# Patient Record
Sex: MALE | Race: WHITE | NOT HISPANIC OR LATINO | URBAN - METROPOLITAN AREA
[De-identification: names, ages, dates, MRNs, and addresses within clinical notes are randomized per-mention and may not be internally consistent; named-entity substitution may affect disease eponyms.]

---

## 2017-01-11 ENCOUNTER — OFFICE VISIT - RIVER FALLS (OUTPATIENT)
Dept: FAMILY MEDICINE | Facility: CLINIC | Age: 62
End: 2017-01-11

## 2017-01-11 ASSESSMENT — MIFFLIN-ST. JEOR: SCORE: 1834.86

## 2017-01-23 ENCOUNTER — OFFICE VISIT - RIVER FALLS (OUTPATIENT)
Dept: FAMILY MEDICINE | Facility: CLINIC | Age: 62
End: 2017-01-23

## 2017-01-26 ENCOUNTER — OFFICE VISIT - RIVER FALLS (OUTPATIENT)
Dept: FAMILY MEDICINE | Facility: CLINIC | Age: 62
End: 2017-01-26

## 2017-01-30 ENCOUNTER — OFFICE VISIT - RIVER FALLS (OUTPATIENT)
Dept: FAMILY MEDICINE | Facility: CLINIC | Age: 62
End: 2017-01-30

## 2017-02-02 ENCOUNTER — OFFICE VISIT - RIVER FALLS (OUTPATIENT)
Dept: FAMILY MEDICINE | Facility: CLINIC | Age: 62
End: 2017-02-02

## 2017-02-06 ENCOUNTER — OFFICE VISIT - RIVER FALLS (OUTPATIENT)
Dept: FAMILY MEDICINE | Facility: CLINIC | Age: 62
End: 2017-02-06

## 2017-07-19 ENCOUNTER — OFFICE VISIT - RIVER FALLS (OUTPATIENT)
Dept: FAMILY MEDICINE | Facility: CLINIC | Age: 62
End: 2017-07-19

## 2017-10-24 ENCOUNTER — AMBULATORY - RIVER FALLS (OUTPATIENT)
Dept: FAMILY MEDICINE | Facility: CLINIC | Age: 62
End: 2017-10-24

## 2017-10-25 LAB
CHOLEST SERPL-MCNC: 254 MG/DL
CHOLEST/HDLC SERPL: 3.6 {RATIO}
HDLC SERPL-MCNC: 70 MG/DL
LDLC SERPL CALC-MCNC: 164 MG/DL
NONHDLC SERPL-MCNC: 184 MG/DL
TRIGL SERPL-MCNC: 96 MG/DL

## 2017-10-31 ENCOUNTER — OFFICE VISIT - RIVER FALLS (OUTPATIENT)
Dept: FAMILY MEDICINE | Facility: CLINIC | Age: 62
End: 2017-10-31

## 2017-10-31 ASSESSMENT — MIFFLIN-ST. JEOR: SCORE: 1835.77

## 2018-01-10 ENCOUNTER — OFFICE VISIT - RIVER FALLS (OUTPATIENT)
Dept: FAMILY MEDICINE | Facility: CLINIC | Age: 63
End: 2018-01-10

## 2018-11-16 ENCOUNTER — OFFICE VISIT - RIVER FALLS (OUTPATIENT)
Dept: FAMILY MEDICINE | Facility: CLINIC | Age: 63
End: 2018-11-16

## 2018-11-17 LAB
CHOLEST SERPL-MCNC: 227 MG/DL
CHOLEST/HDLC SERPL: 3.3 {RATIO}
CREAT SERPL-MCNC: 1.12 MG/DL (ref 0.7–1.25)
GLUCOSE BLD-MCNC: 111 MG/DL (ref 65–99)
HDLC SERPL-MCNC: 68 MG/DL
LDLC SERPL CALC-MCNC: 134 MG/DL
NONHDLC SERPL-MCNC: 159 MG/DL
TRIGL SERPL-MCNC: 137 MG/DL

## 2018-11-27 ENCOUNTER — OFFICE VISIT - RIVER FALLS (OUTPATIENT)
Dept: FAMILY MEDICINE | Facility: CLINIC | Age: 63
End: 2018-11-27

## 2019-06-25 ENCOUNTER — COMMUNICATION - RIVER FALLS (OUTPATIENT)
Dept: FAMILY MEDICINE | Facility: CLINIC | Age: 64
End: 2019-06-25

## 2019-12-20 ENCOUNTER — AMBULATORY - RIVER FALLS (OUTPATIENT)
Dept: FAMILY MEDICINE | Facility: CLINIC | Age: 64
End: 2019-12-20

## 2019-12-21 LAB
BUN SERPL-MCNC: 16 MG/DL (ref 7–25)
BUN/CREAT RATIO - HISTORICAL: ABNORMAL (ref 6–22)
CALCIUM SERPL-MCNC: 9.3 MG/DL (ref 8.6–10.3)
CHLORIDE BLD-SCNC: 106 MMOL/L (ref 98–110)
CHOLEST SERPL-MCNC: 245 MG/DL
CHOLEST/HDLC SERPL: 4 {RATIO}
CO2 SERPL-SCNC: 27 MMOL/L (ref 20–32)
CREAT SERPL-MCNC: 1.13 MG/DL (ref 0.7–1.25)
EGFRCR SERPLBLD CKD-EPI 2021: 69 ML/MIN/1.73M2
GLUCOSE BLD-MCNC: 101 MG/DL (ref 65–99)
HDLC SERPL-MCNC: 62 MG/DL
LDLC SERPL CALC-MCNC: 157 MG/DL
NONHDLC SERPL-MCNC: 183 MG/DL
POTASSIUM BLD-SCNC: 4.4 MMOL/L (ref 3.5–5.3)
SODIUM SERPL-SCNC: 140 MMOL/L (ref 135–146)
TRIGL SERPL-MCNC: 137 MG/DL

## 2019-12-23 ENCOUNTER — COMMUNICATION - RIVER FALLS (OUTPATIENT)
Dept: FAMILY MEDICINE | Facility: CLINIC | Age: 64
End: 2019-12-23

## 2019-12-27 ENCOUNTER — OFFICE VISIT - RIVER FALLS (OUTPATIENT)
Dept: FAMILY MEDICINE | Facility: CLINIC | Age: 64
End: 2019-12-27

## 2019-12-27 ASSESSMENT — MIFFLIN-ST. JEOR: SCORE: 1862.99

## 2021-06-01 ENCOUNTER — RECORDS - HEALTHEAST (OUTPATIENT)
Dept: ADMINISTRATIVE | Facility: CLINIC | Age: 66
End: 2021-06-01

## 2022-02-11 VITALS
HEIGHT: 72 IN | SYSTOLIC BLOOD PRESSURE: 130 MMHG | WEIGHT: 222.4 LBS | DIASTOLIC BLOOD PRESSURE: 76 MMHG | BODY MASS INDEX: 30.12 KG/M2 | TEMPERATURE: 97.3 F | HEART RATE: 60 BPM

## 2022-02-11 VITALS
DIASTOLIC BLOOD PRESSURE: 70 MMHG | WEIGHT: 223.12 LBS | SYSTOLIC BLOOD PRESSURE: 120 MMHG | HEART RATE: 60 BPM | BODY MASS INDEX: 30.05 KG/M2 | TEMPERATURE: 97.4 F

## 2022-02-11 VITALS
SYSTOLIC BLOOD PRESSURE: 120 MMHG | TEMPERATURE: 97 F | BODY MASS INDEX: 30.94 KG/M2 | WEIGHT: 228.4 LBS | HEIGHT: 72 IN | HEART RATE: 80 BPM | DIASTOLIC BLOOD PRESSURE: 66 MMHG

## 2022-02-11 VITALS
WEIGHT: 222.2 LBS | BODY MASS INDEX: 30.1 KG/M2 | SYSTOLIC BLOOD PRESSURE: 112 MMHG | HEIGHT: 72 IN | TEMPERATURE: 98.1 F | DIASTOLIC BLOOD PRESSURE: 78 MMHG | HEART RATE: 60 BPM

## 2022-02-16 NOTE — TELEPHONE ENCOUNTER
Entered by Evelyn Leung CMA on February 18, 2021 10:06:42 AM CST  ---------------------  From: Evelyn Leung CMA   To: [facility]    Sent: 2/18/2021 10:06:42 AM CST  Subject: Medication Management     ** Not Approved: Patient no longer under Prescriber care **  doxycycline (DOXYCYCLINE MONOHYDRATE 50MG CAPS)  TAKE ONE CAPSULE BY MOUTH EVERY DAY  Qty:  90 unknown unit        Days Supply:  90        Refills:  3          Substitutions Allowed     Route To Pharmacy - [facility]   Signed by Evelyn Leung CMA            ------------------------------------------  From: [facility]  To: Amauri Kidd MD  Sent: February 17, 2021 4:19:01 PM CST  Subject: Medication Management  Due: February 17, 2021 2:12:29 PM CST     ** On Hold Pending Signature **     Drug: doxycycline (doxycycline monohydrate 50 mg oral capsule), TAKE ONE CAPSULE BY MOUTH EVERY DAY  Quantity: 90 unknown unit  Days Supply: 90  Refills: 2  Substitutions Allowed  Notes from Pharmacy:     Dispensed Drug: doxycycline (doxycycline monohydrate 50 mg oral capsule), TAKE ONE CAPSULE BY MOUTH EVERY DAY  Quantity: 90 unknown unit  Days Supply: 90  Refills: 3  Substitutions Allowed  Notes from Pharmacy:  ------------------------------------------

## 2022-02-16 NOTE — PROGRESS NOTES
Patient:   JOSE DE JESUS DUQUE            MRN: [id number]            FIN: [id number]               Age:   61 years     Sex:  Male     :  1955   Associated Diagnoses:   Right cervical radiculopathy; DJD (Degenerative Joint Disease); Hypertension; Facial skin lesion   Author:   Amauri Kidd MD      Visit Information      Date of Service: 2017 12:48 pm  Performing Location: [facility]  Encounter#: [id number]      Primary Care Provider (PCP):  Amauri Kidd MD    NPI# [id number]      Referring Provider:  No referring provider recorded for selected visit.      Chief Complaint   2017 12:55 PM CST   Preop scheduled for right knee replacement with Di[facility]  at [facility]  2.  non-healing lesion right cheek x 6mos              Additional Information:No additional information recorded during visit.   Chief complaint and symptoms as noted above and confirmed with patient.  Recent lab and diagnostic studies reviewed with patient      History of Present Illness   2016: Jose De Jesus returns to clinic to review recent MRI of cervical spine.  He has recently been seen by Dr. Washington on multiple occasions related to the same complaints.  He describes having a right-sided neck and shoulder pains now for approximately 10 days.  He says the pains will extend down his right forearm into the dorsal aspect of his hand.  Denies any paresthesias.  No weakness.  He has completed a course of oral prednisone as well as trialing gabapentin 300 mg twice daily.  He also has tried oxycodone for pain relief.  He has not found any medication options to have affected the pain.  He was previously seen by his chiropractor and now is working with physical therapy; his first session was earlier today.  He was originally scheduled for knee surgery this week which was postponed due to his current symptoms.  His main goal is to call down the symptoms so he can continue his pursuits of having his knee surgery done.     2017: Jose De Jesus  presents to clinic for preoperative assessment before planned right total knee arthroplasty scheduled for January 18.  Surgery has been rescheduled once due to cervical radiculopathy.  His neck pains have since resolved with use of both physical therapy and gabapentin.  Currently taking gabapentin 300 mg 3 times a day.  He has not tried to wean down to any lower dose.  Feeling well.  No specific complaints.  Good functional status.  No previous perioperative complications.  He did undergo a right total hip replacement over a year ago without any trouble.  Also brings up concerns related to nonhealing bleeding lesion on his right cheek.         Review of Systems   Constitutional:  No fever, No chills.    Eye:  Negative except as documented in history of present illness.    Ear/Nose/Mouth/Throat:  Negative except as documented in history of present illness.    Respiratory:  No shortness of breath.    Cardiovascular:  No chest pain, No palpitations, No peripheral edema, No syncope.    Gastrointestinal:  No nausea, No vomiting, No abdominal pain.    Genitourinary:  No dysuria, No hematuria.    Hematology/Lymphatics:  Negative except as documented in history of present illness.    Endocrine:  No excessive thirst, No polyuria.    Immunologic:  No recurrent fevers.    Musculoskeletal:  Muscle pain, No neck pain, No joint pain.    Neurologic:  Alert and oriented X4, No numbness, No tingling, No headache.       Health Status   Allergies:    Allergic Reactions (Selected)  No known allergies   Medications:  (Selected)   Prescriptions  Prescribed  Inderal LA 60 mg oral capsule, extended release: 1 cap(s) ( 60 mg ), po, daily, x 90 day(s), # 90 cap(s), 3 Refill(s), Type: Physician Stop, Pharmacy: [facility] (Mail Order) Electronic, 1 cap(s) po daily,x90 day(s)  gabapentin 300 mg oral capsule: 1 cap(s) ( 300 mg ), po, bid, # 270 cap(s), 3 Refill(s), Type: Maintenance, called to pharmacy (Rx), increased dose  minocycline 50 mg oral  capsule: 1 cap(s) ( 50 mg ), po, bid, x 90 day(s), # 180 cap(s), 3 Refill(s), Type: Physician Stop, Pharmacy: [facility] (Mail Order) Electronic, 1 cap(s) po bid,x90 day(s)   Problem list:    All Problems  DJD (Degenerative Joint Disease) / ICD-9-.90 / Confirmed  Essential tremor / ICD-9-.1 / Confirmed  Warfarin anticoagulation / SNOMED CT 237079836 / Confirmed  Status post right hip replacement / SNOMED CT 502268130 / Confirmed  Hypertension / SNOMED CT 6644509917 / Confirmed  Obesity / SNOMED CT 4555977458 / Probable  ROSACEA / ICD-9-.3 / Confirmed  Resolved: Colonoscopy / SNOMED CT 349653017      Histories   Past Medical History:    Active  ROSACEA (695.3): Onset in 1995 at 39 years.  DJD (Degenerative Joint Disease) (715.90)  Essential tremor (333.1)  Hypertension (0111211827)  Resolved  Colonoscopy (067667755):  Resolved.  Comments:  10/19/2016 CDT 6:34 PM CDT - Adri Murray MA  Done on 10/17/2016, Repeat on 10 years   Family History:    Pulmonary fibrosis  Father  Acne rosacea  Brother  Brother  Cancer of prostate  Father  Crohn disease  Daughter (Araceli)  Alzheimer disease  Mother     Procedure history:    Colonoscopy (930597840) on 10/17/2016 at 61 Years.  Comments:  10/25/2016 7:54 AM - Jessenia Chauhan  Indication:  Screening for colorectal malignant neoplasm.  Sedation:  Midazolam 3 mg IV, Fentanyl 150 mcg IV.  Impression:  Civerticulosis in the sigmoid colon; exam otherwise normal.  Recommendation:  Repeat in 10 years.  Arthrodesis ([id number]) on 10/26/2009 at 54 Years.  Comments:  5/9/2013 1:18 PM - Collette Kidd  [facility]    5/9/2013 1:13 PM - Collette Kidd  Left subtalar joint.  Surgeon: Sunil Ambrocio MD.  Excision of osteophyte (524730176) on 2/23/2009 at 53 Years.  Comments:  5/9/2013 1:28 PM - Collette Kidd  Excision of bone spur.    5/9/2013 1:18 PM - Collette Kidd  Left.  Open ankle procedure, medially and laterally.  Sunil Ambrocio MD, [facility].  Repair of knee  joint ([id number]) on 11/8/2007 at 52 Years.  Comments:  5/9/2013 1:34 PM - Collette Kidd  Right.  Dr. Varghese, [place], [place].  Colonoscopy ([id number]) on 3/8/2006 at 50 Years.  Removal of mole of skin by excision (300033585) on 6/23/2000 at 44 Years.  Vasectomy (44606646) in 1986 at 31 Years.  Tonsillectomy (833322251) in 1961 at 6 Years.  Comments:  5/9/2013 1:14 PM - Collette Kidd  As a child.  Left ankle (00320068).   Social History:        Alcohol Assessment            Current, Beer (12 oz), 3-5 times per week, 3 drinks/episode average.      Tobacco Assessment            Never      Substance Abuse Assessment            Never      Employment and Education Assessment            Employed, Work/School description: Retired.      Home and Environment Assessment            Marital status:  (Living together).  Spouse/Partner name: Eli.  2 children.      Nutrition and Health Assessment            Type of diet: Regular.      Exercise and Physical Activity Assessment            Exercise frequency: Daily.  Exercise type: stretching.      Sexual Assessment            Sexually active: Yes.  Sexual orientation: Heterosexual.      Other Assessment            . Last Eye Exam: 04/2015 and Last Dental Exam: 07/13/2015., Marital status.  Children: Judd        Physical Examination   vital signs stable, as noted above   Vital Signs   1/11/2017 12:55 PM CST Temperature Tympanic 98.1 DegF    Peripheral Pulse Rate 60 bpm    Systolic Blood Pressure 112 mmHg    Diastolic Blood Pressure 78 mmHg    Mean Arterial Pressure 89 mmHg      Measurements from flowsheet : Measurements   1/11/2017 12:55 PM CST Height Measured - Standard 72.25 in    Weight Measured - Standard 222.2 lb    BSA 2.26 m2    Body Mass Index 29.92 kg/m2      General:  Alert and oriented, No acute distress.    Eye:  Extraocular movements are intact.    HENT:  Normocephalic, Oral mucosa is moist.    Neck:  Supple, Non-tender.     Respiratory:  Lungs are clear to auscultation, Respirations are non-labored.    Cardiovascular:  Normal rate, Regular rhythm, No murmur, No edema.    Gastrointestinal:  Soft.    Musculoskeletal:  Normal range of motion, Normal strength, No tenderness.    Integumentary:  bleeding, facial ulceration on right cheek.    Neurologic:  Alert, Oriented, Normal motor function, No focal deficits.    Cognition and Speech:  Oriented, Speech clear and coherent.    Psychiatric:  Appropriate mood & affect.       Review / Management   Results review:  Lab results   1/11/2017 1:46 PM CST ABO/Rh O Rh Positive    Antibody Screen Negative    BB Specimen Expiration Dt/Tm 01/15/17 23:59   11/17/2016 4:50 PM CST Sodium Level 138 mmol/L    Potassium Level 4.5 mmol/L    Chloride Level 104 mmol/L    CO2 Level 27 mmol/L    Glucose Level 92 mg/dL    BUN 23 mg/dL    Creatinine 1.39 mg/dL  HI    BUN/Creat Ratio 17    eGFR 55 mL/min/1.73m2  LOW    eGFR African American 63 mL/min/1.73m2    Calcium Level 9.5 mg/dL    WBC 5.9    RBC 4.29    Hgb 13.9 gm/dL    Hct 40.5 %    MCV 94.5 fL    MCH 32.5 pg    MCHC 34.4 gm/dL    RDW 13.0 %    Platelet 225    MPV 7.6 fL   .       Impression and Plan   Diagnosis     Right cervical radiculopathy (OVV13-JG M54.12).     DJD (Degenerative Joint Disease) (GVO04-KK M19.90).     Hypertension (XBT10-XM I10).     Facial skin lesion (KDO97-AC L98.9).       .) pre-op, planned right total knee arthroplasty, 1/18/2017, [facility], Dr. Ramirez   - ECG (11/2016), reviewed: NSR   - obtain BMP, CBC, T&S   - continue with propranolol ER throughout ben-operative timeframe   - advised reducing gabapentin to 300mg BID; further plans to wean off after completion of surgery   - moderate risk procedure in patient with low risk patient profile; no further pre-operative risk assessment indicated    .) cervicalgia  MRI of cervical spine (11/2016)   - C3-C4, C4-C5, C6-7 foraminal stenosis on right side; most severe at C4-C5   - clinically  his symptoms correlate with right sided C5 radiculopathy   - improved with conservative measures    .) facial skin lesion   - persistent for several months with bleeding despite limited local trauma from shaving   - referral to derm

## 2022-02-16 NOTE — LETTER
(Inserted Image. Unable to display)   December 23, 2019      XAVIER NETO   [facility] [address]  [place], [place] [id number]        Dear XAVIER,     Thank you for selecting [facility] (previously [facility]) for your healthcare needs. Below you will find the results of your recent test(s) done at our clinic.       Labs for your review.  We can discuss in more detail at future clinic visit.       Result Name Current Result Previous Result Reference Range   Sodium Level (mmol/L)  140 12/20/2019  138 11/16/2018 135 - 146   Potassium Level (mmol/L)  4.4 12/20/2019  4.4 11/16/2018 3.5 - 5.3   Chloride Level (mmol/L)  106 12/20/2019  106 11/16/2018 98 - 110   CO2 Level (mmol/L)  27 12/20/2019  27 11/16/2018 20 - 32   Glucose Level (mg/dL) ((H)) 101 12/20/2019 ((H)) 111 11/16/2018 65 - 99   BUN (mg/dL)  16 12/20/2019  20 11/16/2018 7 - 25   Creatinine Level (mg/dL)  1.13 12/20/2019  1.12 11/16/2018 0.70 - 1.25   BUN/Creat Ratio  NOT APPLICABLE 12/20/2019  NOT APPLICABLE 11/16/2018 6 - 22   eGFR (mL/min/1.73m2)  69 12/20/2019  70 11/16/2018 > OR = 60 -    eGFR  (mL/min/1.73m2)  80 12/20/2019  81 11/16/2018 > OR = 60 -    Calcium Level (mg/dL)  9.3 12/20/2019  9.5 11/16/2018 8.6 - 10.3   Cholesterol (mg/dL) ((H)) 245 12/20/2019 ((H)) 227 11/16/2018  - <200   Non-HDL Cholesterol ((H)) 183 12/20/2019 ((H)) 159 11/16/2018  - <130   HDL (mg/dL)  62 12/20/2019  68 11/16/2018 >40 -    Cholesterol/HDL Ratio  4.0 12/20/2019  3.3 11/16/2018  - <5.0   LDL ((H)) 157 12/20/2019 ((H)) 134 11/16/2018    Triglyceride (mg/dL)  137 12/20/2019  137 11/16/2018  - <150       Please contact me or my assistant at [phone] if you have any questions or concerns.     Sincerely,        Amauri Kidd MD    What do your labs mean?  Below is a glossary of commonly ordered labs:  LDL - Bad Cholesterol  HDL - Good Cholesterol  AST/ALT - Liver Function  Cr/Creatinine - Kidney Function  Microalbumin - Kidney  Function  BUN - Kidney Function  PSA - Prostate   TSH - Thyroid Hormone  HgbA1c - Diabetes Test  Hgb (Hemoglobin) - Red Blood Cells

## 2022-02-16 NOTE — TELEPHONE ENCOUNTER
Entered by Latanya Marquez CMA on February 18, 2021 1:32:54 PM CST  ---------------------  From: Latanya Marquez CMA   To: [facility]    Sent: 2/18/2021 1:32:54 PM CST  Subject: Medication Management     ** Not Approved: Patient no longer under Prescriber care, second request **  doxycycline (DOXYCYCLINE MONOHYDRATE 50MG CAPS)  TAKE ONE CAPSULE BY MOUTH EVERY DAY  Qty:  90 unknown unit        Days Supply:  90        Refills:  3          Substitutions Allowed     Route To Pharmacy - [facility]   Signed by Latanya Marquez CMA            ------------------------------------------  From: [facility]  To: Amauri Kidd MD  Sent: February 18, 2021 11:21:00 AM CST  Subject: Medication Management  Due: February 17, 2021 2:12:29 PM CST     ** On Hold Pending Signature **     Drug: doxycycline (doxycycline monohydrate 50 mg oral capsule), TAKE ONE CAPSULE BY MOUTH EVERY DAY  Quantity: 90 unknown unit  Days Supply: 90  Refills: 2  Substitutions Allowed  Notes from Pharmacy:     Dispensed Drug: doxycycline (doxycycline monohydrate 50 mg oral capsule), TAKE ONE CAPSULE BY MOUTH EVERY DAY  Quantity: 90 unknown unit  Days Supply: 90  Refills: 3  Substitutions Allowed  Notes from Pharmacy:  ------------------------------------------

## 2022-02-16 NOTE — TELEPHONE ENCOUNTER
Entered by Vicky Lopez CMA on January 12, 2021 1:44:04 PM CST  ---------------------  From: Vicky Lopez CMA   To: [facility]    Sent: 1/12/2021 1:44:04 PM CST  Subject: Medication Management     ** Not Approved: Patient no longer under Prescriber care **  propranolol (PROPRANOLOL HCL ER 60MG CP24)  TAKE ONE CAPSULE BY MOUTH EVERY DAY  Qty:  90 unknown unit        Days Supply:  90        Refills:  3          Substitutions Allowed     Route To Pharmacy - [facility]   Signed by Vicky Lopez CMA            ------------------------------------------  From: [facility]  To: Amauri Kidd MD  Sent: January 12, 2021 9:39:35 AM CST  Subject: Medication Management  Due: January 12, 2021 10:11:19 AM CST     ** On Hold Pending Signature **     Drug: propranolol (propranolol 60 mg oral capsule, extended release), TAKE ONE CAPSULE BY MOUTH EVERY DAY  Quantity: 90 unknown unit  Days Supply: 90  Refills: 3  Substitutions Allowed  Notes from Pharmacy:     Dispensed Drug: propranolol (propranolol 60 mg oral capsule, extended release), TAKE ONE CAPSULE BY MOUTH EVERY DAY  Quantity: 90 unknown unit  Days Supply: 90  Refills: 3  Substitutions Allowed  Notes from Pharmacy:  ------------------------------------------

## 2022-02-16 NOTE — PROGRESS NOTES
Patient:   JOSE DE JESUS DUQUE            MRN: [id number]            FIN: [id number]               Age:   62 years     Sex:  Male     :  1955   Associated Diagnoses:   Right cervical radiculopathy; DJD (Degenerative Joint Disease); Hypertension   Author:   Amauri Kidd MD      Visit Information      Date of Service: 10/31/2017 08:48 am  Performing Location: [facility]  Encounter#: [id number]      Primary Care Provider (PCP):  Amauri Kidd MD    NPI# [id number]      Referring Provider:  Amauri Kidd MD    NPI# [id number]      Chief Complaint   10/31/2017 8:55 AM CDT   Annual physical              Additional Information:No additional information recorded during visit.   Chief complaint and symptoms as noted above and confirmed with patient.  Recent lab and diagnostic studies reviewed with patient      History of Present Illness   10/31/2017: Jose De Jesus presents for annual physical.  Describes some lower back pain aggravated with increased walking during pheasant hunting season.  Typically will walk as much as 30 miles a week during hunting season.  Denies any chest pains.  Does have a significant family history for premature death involving 2 sisters.  No other voiced complaints.         Review of Systems   Constitutional:  No fever, No chills.    Eye:  Negative except as documented in history of present illness.    Ear/Nose/Mouth/Throat:  Negative except as documented in history of present illness.    Respiratory:  No shortness of breath.    Cardiovascular:  No chest pain, No palpitations, No peripheral edema, No syncope.    Gastrointestinal:  No nausea, No vomiting, No abdominal pain.    Genitourinary:  No dysuria, No hematuria.    Hematology/Lymphatics:  Negative except as documented in history of present illness.    Endocrine:  No excessive thirst, No polyuria.    Immunologic:  No recurrent fevers.    Musculoskeletal:  Back pain, Muscle pain, No neck pain, No joint pain.    Neurologic:  Alert and oriented  X4, No numbness, No tingling, No headache.       Health Status   Allergies:    Allergic Reactions (Selected)  No known allergies   Medications:  (Selected)   Prescriptions  Prescribed  gabapentin 300 mg oral capsule: 1 cap(s) ( 300 mg ), po, bid, # 270 cap(s), 3 Refill(s), Type: Maintenance, called to pharmacy (Rx), increased dose  minocycline 50 mg oral capsule: See Instructions, Instructions: TAKE ONE CAPSULE BY MOUTH TWICE A DAY, # 180 unknown unit, 1 Refill(s), Type: Soft Stop, Pharmacy: [facility], TAKE ONE CAPSULE BY MOUTH TWICE A DAY  propranolol 60 mg oral capsule, extended release: 1 cap(s) ( 60 mg ), po, daily, # 90 cap(s), 2 Refill(s), Type: Maintenance, Pharmacy: [facility]   Problem list:    All Problems  DJD (Degenerative Joint Disease) / ICD-9-.90 / Confirmed  Essential tremor / ICD-9-.1 / Confirmed  Warfarin anticoagulation / SNOMED CT 672959172 / Confirmed  Status post right hip replacement / SNOMED CT 672077650 / Confirmed  S/p knee replacement / SNOMED CT 157189467 / Confirmed  Hypertension / SNOMED CT 1154424760 / Confirmed  Obesity / SNOMED CT 2598541402 / Probable  ROSACEA / ICD-9-.3 / Confirmed  Resolved: Inpatient stay / SNOMED CT 695741944  Canceled: Colonoscopy / SNOMED CT 179099750      Histories   Past Medical History:    Active  ROSACEA (695.3): Onset in 1995 at 39 years.  DJD (Degenerative Joint Disease) (715.90)  Essential tremor (333.1)  Hypertension (6206316053)  Resolved  Inpatient stay (853140921): Onset on 1/18/2017 at 61 years.  Resolved on 1/20/2017 at 61 years.  Comments:  2/7/2017 CST 7:38 AM MASON - Jessie Choudhury  @[facility] - Primary osteoarthritis of right knee   Family History:    Pulmonary fibrosis  Father  Acne rosacea  Brother  Brother  Cancer of prostate  Father  Crohn disease  Daughter (Araceli)  Alzheimer disease  Mother     Procedure history:    Arthroplasty of knee (66885923) on 1/18/2017 at 61  Years.  Comments:  2/7/2017 7:39 AM - Jessie Choudhury  Right  Colonoscopy (917238689) on 10/17/2016 at 61 Years.  Comments:  10/25/2016 7:54 AM - Jessenia Chauhan  Indication:  Screening for colorectal malignant neoplasm.  Sedation:  Midazolam 3 mg IV, Fentanyl 150 mcg IV.  Impression:  Civerticulosis in the sigmoid colon; exam otherwise normal.  Recommendation:  Repeat in 10 years.  Arthrodesis (30409399) on 10/26/2009 at 54 Years.  Comments:  5/9/2013 1:18 PM - Collette Kidd  [facility]    5/9/2013 1:13 PM - Collette Kidd  Left subtalar joint.  Surgeon: Sunil Ambrocio MD.  Excision of osteophyte (250261474) on 2/23/2009 at 53 Years.  Comments:  5/9/2013 1:28 PM - Collette Kidd  Excision of bone spur.    5/9/2013 1:18 PM - Collette Kidd  Left.  Open ankle procedure, medially and laterally.  Sunil Ambrocio MD, [facility].  Repair of knee joint (64693386) on 11/8/2007 at 52 Years.  Comments:  5/9/2013 1:34 PM - Collette Kidd  Right.  Dr. Varghese, [place], [place].  Colonoscopy (099562175) on 3/8/2006 at 50 Years.  Removal of mole of skin by excision (971855800) on 6/23/2000 at 44 Years.  Vasectomy (75891665) in 1986 at 31 Years.  Tonsillectomy (852391064) in 1961 at 6 Years.  Comments:  5/9/2013 1:14 PM - Collette Kidd  As a child.  Left ankle (44154819).   Social History:        Alcohol Assessment            Current, Beer (12 oz), 3-5 times per week, 1 drinks/episode average.  2 drinks/episode maximum.      Tobacco Assessment            Never      Substance Abuse Assessment            Never      Employment and Education Assessment            Employed, Work/School description: Retired.      Home and Environment Assessment            Marital status:  (Living together).  Spouse/Partner name: Eli.  2 children.      Nutrition and Health Assessment            Type of diet: Regular.      Exercise and Physical Activity Assessment            Exercise frequency: Daily.  Exercise type: stretching.       Sexual Assessment            Sexually active: Yes.  Sexual orientation: Heterosexual.      Other Assessment            . Last Eye Exam: 04/2015 and Last Dental Exam: 07/13/2015., Marital status.  Children: Judd        Physical Examination   vital signs stable, as noted above   Vital Signs   10/31/2017 8:55 AM CDT Temperature Tympanic 97.3 DegF  LOW    Peripheral Pulse Rate 60 bpm    Pulse Site Radial artery    HR Method Manual    Systolic Blood Pressure 130 mmHg    Diastolic Blood Pressure 76 mmHg    Mean Arterial Pressure 94 mmHg    BP Site Right arm    BP Method Manual      Measurements from flowsheet : Measurements   10/31/2017 8:55 AM CDT Height Measured - Standard 72.25 in    Weight Measured - Standard 222.4 lb    BSA 2.27 m2    Body Mass Index 29.95 kg/m2      General:  Alert and oriented, No acute distress.    Eye:  Extraocular movements are intact.    HENT:  Normocephalic, Oral mucosa is moist.    Neck:  Supple, Non-tender.    Respiratory:  Lungs are clear to auscultation, Respirations are non-labored.    Cardiovascular:  Normal rate, Regular rhythm, No murmur, No edema.    Gastrointestinal:  Soft.    Musculoskeletal:  Normal range of motion, Normal strength, No tenderness,  Negative straight leg raise bilaterally.    Neurologic:  Alert, Oriented, Normal motor function, No focal deficits.    Cognition and Speech:  Oriented, Speech clear and coherent.    Psychiatric:  Appropriate mood & affect.       Review / Management   Results review:  Lab results   10/24/2017 8:16 AM CDT Cholesterol 254 mg/dL  HI    Non-  HI    HDL 70 mg/dL    Chol/HDL Ratio 3.6      HI    Triglyceride 96 mg/dL   .       Impression and Plan   Diagnosis     Right cervical radiculopathy (YGB97-GR M54.12).     DJD (Degenerative Joint Disease) (RTO90-WT M19.90).     Hypertension (GQY82-WF I10).       .) cervicalgia  MRI of cervical spine (11/2016)   - C3-C4, C4-C5, C6-7 foraminal stenosis on right side; most  severe at C4-C5   - clinically his symptoms correlate with right sided C5 radiculopathy   - improved with conservative measures    .) HLPD  - coronary calcium risk score of 8.5%  - family history of premature heart disease  - making arrangements for coronary calcium scoring    .) lumbar back spasm  - conservative therapies    .) health maintenance  - family h/o colon cancer/polyps  - normal colonoscopy '16; would plan to repeat in '21  - declines PSA testing for now    RTC annually

## 2022-02-16 NOTE — NURSING NOTE
Depression Screening Entered On:  12/31/2019 9:04 AM CST    Performed On:  12/27/2019 9:04 AM CST by Evelyn Leung CMA               Depression Screening   Little Interest - Pleasure in Activities :   Not at all   Trouble Falling or Staying Asleep :   Not at all   Feeling Tired or Little Energy :   Not at all   Poor Appetite or Overeating :   Not at all   Feeling Bad About Yourself :   Not at all   Trouble Concentrating :   Not at all   Moving or Speaking Slowly :   Not at all   Thoughts Better Off Dead or Hurting Self :   Not at all   Detailed Depression Screen Score :   0    AAMIR Difficulty with Work, Home, Others :   Not difficult at all   Evelyn Leung CMA - 12/31/2019 9:04 AM CST

## 2022-02-16 NOTE — LETTER
(Inserted Image. Unable to display)         February 13, 2020        XAVIER NETO   [place], [place] [id number]        Dear XAVIER,    Thank you for selecting [facility] for your healthcare needs.    Our records indicate you are due for the following services:     Colon Cancer Screening Stool Cards ~ Stool cards were sent home with you within the past 3 months and have not been returned to us for testing. You may either drop off your stool cards at your clinic, or send them to us in the mail.     To schedule an appointment or if you have further questions, please contact your primary clinic:   [facility]       [phone]   [facility]       [phone]              [facility]     [phone]      Powered by bewarket    Sincerely,    Amauri Kidd MD

## 2022-02-16 NOTE — NURSING NOTE
CAGE Assessment Entered On:  12/31/2019 9:04 AM CST    Performed On:  12/27/2019 9:04 AM CST by Evelyn Leung CMA               Assessment   Have you ever felt you should cut down on your drinking :   No   Have people annoyed you by criticizing your drinking :   No   Have you ever felt bad or guilty about your drinking :   No   Have you ever taken a drink first thing in the morning to steady your nerves or get rid of a hangover (Eye-opener) :   No   CAGE Score :   0    Evelyn Leung CMA - 12/31/2019 9:04 AM CST

## 2022-02-16 NOTE — NURSING NOTE
Comprehensive Intake Entered On:  12/27/2019 1:06 PM CST    Performed On:  12/27/2019 1:02 PM CST by Evelyn Leung CMA               Summary   Chief Complaint :   physical   Advance Directive :   Yes   Weight Measured :   228.4 lb(Converted to: 228 lb 6 oz, 103.60 kg)    Height Measured :   72.25 in(Converted to: 6 ft 0 in, 183.51 cm)    Body Mass Index :   30.76 kg/m2 (HI)    Body Surface Area :   2.3 m2   Systolic Blood Pressure :   120 mmHg   Diastolic Blood Pressure :   66 mmHg   Mean Arterial Pressure :   84 mmHg   Peripheral Pulse Rate :   80 bpm   Temperature Tympanic :   97 DegF(Converted to: 36.1 DegC)  (LOW)    Evelyn Leung CMA - 12/27/2019 1:02 PM CST   Health Status   Allergies Verified? :   Yes   Medication History Verified? :   Yes   Immunizations Current :   Yes   Medical History Verified? :   Yes   Pre-Visit Planning Status :   Completed   Tobacco Use? :   Never smoker   Evelyn Leung CMA - 12/27/2019 1:02 PM CST   Consents   Consent for Immunization Exchange :   Consent Granted   Consent for Immunizations to Providers :   Consent Granted   Evelyn Leung CMA - 12/27/2019 1:02 PM CST   Social History   Social History   (As Of: 12/27/2019 1:06:09 PM CST)   Alcohol:        Current, 5-6 times per week, 5 drinks/episode average.  6 drinks/episode maximum.   (Last Updated: 11/29/2018 11:41:30 AM CST by Kaela Zheng)          Tobacco:        Never   (Last Updated: 1/17/2011 7:56:51 AM CST by Shakira Bullock CMA)          Substance Abuse:        Never   (Last Updated: 1/17/2011 3:26:58 PM CST by Shakira Bullock CMA)          Employment/School:        Employed, Work/School description: Retired.   (Last Updated: 6/18/2015 11:13:07 AM CDT by Kathryn Longoria CMA)          Home/Environment:        Marital status:  (Living together).  Spouse/Partner name: Eli.  2 children.  Risks in environment: Does not wear helmet, owns secured gun.   (Last Updated: 11/2/2017 2:07:59 PM CDT by Kaela Zheng)           Nutrition/Health:        Type of diet: Regular.   (Last Updated: 6/18/2015 11:13:21 AM CDT by Kathryn Longoria CMA)          Exercise:        Exercise frequency: 2-3 times per week.  Exercise type: Walking, work.   (Last Updated: 11/29/2018 11:46:36 AM CST by Kaela Zheng)          Sexual:        Sexually active: Yes.  Identifies as male, Sexual orientation: Straight or heterosexual.   (Last Updated: 11/29/2018 11:46:50 AM CST by Kaela Zheng)

## 2022-02-16 NOTE — TELEPHONE ENCOUNTER
---------------------  From: Vicky Lopez CMA   To: Appointment Pool ([id number]);     Sent: 1/12/2021 1:43:28 PM CST  Subject: Transfer of care     Pt transferred care to [facility] in Oct 2020. Please remove [facility] as PCP---------------------  From: Ryanne Bradley (Appointment Pool ([id number]))   To: Vicky Lopez CMA;     Sent: 1/12/2021 1:56:37 PM CST  Subject: RE: Transfer of care     This is done

## 2022-02-16 NOTE — TELEPHONE ENCOUNTER
---------------------  From: Luba Gilliland LPN (Phone Messages Pool ([id number]_[facility]))   To: HAKEEM Message Pool ([id number]);     Sent: 6/25/2019 1:15:31 PM CDT  Subject: orthotics order        Phone Message    PCP:   HAKEEM      Time of Call:  1203       Person Calling:  Patient   Phone number:  [phone]    Returned call at:     Note:   Patient is requesting orthotics order be sent.  Please advise.     Last office visit and reason:  11/27/2018 PxLM for return call at 1547    ? if he's had before and where  ? what is the reason for orthotics/Dx??Patient called back @ 1630. States he had a Subtibular Dislocation at work years ago and ended up having a left ankle fusion w/ Dr Ambrocio @ [facility]. Had orthotics ordered and given through [facility] in [place]. States he is flat footed and has been using the same orthotics for 10 years, but noticing issues w/ walking as they are wearing out. He does have an appt at [facility] next door next Tuesday and requests the order/Rx be sent to them. Ok to [facility] when calling patient back.Alicia @ [facility] called @ 1640 requesting a copy of the orthotics order.  ** Submitted: **  Order:Physical Therapy (Request)  Orthotic fitting  Details:  Instructions: Orthotic fitting, Flat foot         Signed by Evelyn Leung CMA  6/26/2019 8:43:00 AMpt called back 6/25 at 5p and advised order will be sent over

## 2022-02-16 NOTE — PROGRESS NOTES
Patient:   JOSE DE JESUS DUQUE            MRN: [id number]            FIN: [id number]               Age:   64 years     Sex:  Male     :  1955   Associated Diagnoses:   Right cervical radiculopathy; DJD (Degenerative Joint Disease); Hypertension   Author:   Amauri Kidd MD      Visit Information      Date of Service: 2019 12:52 pm  Performing Location: [facility]  Encounter#: [id number]      Primary Care Provider (PCP):  Amauri Kidd MD    NPI# [id number]      Referring Provider:  Amauri Kidd MD    NPI# [id number]      Chief Complaint   2019 1:02 PM CST   physical              Additional Information:No additional information recorded during visit.   Chief complaint and symptoms as noted above and confirmed with patient.  Recent lab and diagnostic studies reviewed with patient      History of Present Illness   2019: Jose De Jesus returns for annual physical.  Overall feeling well.  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  Favors his right knee at times.  Denies any chest pains or dyspnea.         Review of Systems   Constitutional:  No fever, No chills.    Eye:  Negative except as documented in history of present illness.    Ear/Nose/Mouth/Throat:  Negative except as documented in history of present illness.    Respiratory:  No shortness of breath.    Cardiovascular:  No chest pain, No palpitations, No peripheral edema, No syncope.    Gastrointestinal:  No nausea, No vomiting, No abdominal pain.    Genitourinary:  No dysuria, No hematuria.    Hematology/Lymphatics:  Negative except as documented in history of present illness.    Endocrine:  No excessive thirst, No polyuria.    Immunologic:  No recurrent fevers.    Musculoskeletal:  Joint pain, No back pain, No neck pain, No muscle pain.    Neurologic:  Alert and oriented X4, No numbness, No tingling, No headache.       Health Status   Allergies:    Allergic Reactions (Selected)  No Known Medication Allergies   Medications:  (Selected)    Prescriptions  Prescribed  doxycycline monohydrate 50 mg oral capsule: = 1 cap(s) ( 50 mg ), Oral, daily, # 90 cap(s), 3 Refill(s), Type: Maintenance, Pharmacy: [facility], 1 cap(s) Oral daily  propranolol 60 mg oral capsule, extended release: = 1 cap(s) ( 60 mg ), po, daily, # 90 cap(s), 3 Refill(s), Type: Maintenance, Pharmacy: [facility], 1 cap(s) Oral daily,    Medications          *denotes recorded medication          doxycycline monohydrate 50 mg oral capsule: 50 mg, 1 cap(s), Oral, daily, 90 cap(s), 3 Refill(s).          propranolol 60 mg oral capsule, extended release: 60 mg, 1 cap(s), po, daily, 90 cap(s), 3 Refill(s).       Problem list:    All Problems  Essential tremor / SNOMED CT 7308492370 / Confirmed  Warfarin anticoagulation / SNOMED CT 315204212 / Confirmed  Status post right hip replacement / SNOMED CT 197803918 / Confirmed  S/p knee replacement / SNOMED CT 731588506 / Confirmed  Hypertension / SNOMED CT 9487583723 / Confirmed  Obesity / SNOMED CT 8055965437 / Probable  DJD (degenerative joint disease) / SNOMED CT 6336413936 / Confirmed  Rosacea / SNOMED CT 0477122807 / Confirmed  Resolved: Inpatient stay / SNOMED CT 155337224  Canceled: Colonoscopy / SNOMED CT 493415683      Histories   Past Medical History:    Active  Rosacea (2796410119): Onset in 1995 at 40 years.  DJD (degenerative joint disease) (7222018471)  Essential tremor (3876974470)  Hypertension (0808496885)  Resolved  Inpatient stay (673516649): Onset on 1/18/2017 at 61 years.  Resolved on 1/20/2017 at 61 years.  Comments:  2/7/2017 CST 7:38 AM MASON - Jessie Choudhury  @[facility] - Primary osteoarthritis of right knee   Family History:    Pulmonary fibrosis  Father  Hypertension  Father  Acne rosacea  Brother  Brother  Cancer of prostate  Father  Crohn disease  Daughter (Araceli)  Alzheimer disease  Mother  Diabetes  Mother     Procedure history:    Arthroplasty of knee (92284143) on 1/18/2017  at 61 Years.  Comments:  2/7/2017 7:39 AM CST - Jessie Choudhury  Right  Colonoscopy (138653188) on 10/17/2016 at 61 Years.  Comments:  10/25/2016 7:54 AM CHUCKT - Jessenia Chauhan  Indication:  Screening for colorectal malignant neoplasm.  Sedation:  Midazolam 3 mg IV, Fentanyl 150 mcg IV.  Impression:  Civerticulosis in the sigmoid colon; exam otherwise normal.  Recommendation:  Repeat in 10 years.  Arthrodesis (72763646) on 10/26/2009 at 54 Years.  Comments:  5/9/2013 1:18 PM Collette Denise  [facility]    5/9/2013 1:13 PM Collette Denise  Left subtalar joint.  Surgeon: Sunil Ambrocio MD.  Excision of osteophyte (964543269) on 2/23/2009 at 53 Years.  Comments:  5/9/2013 1:28 PM Collette Denise  Excision of bone spur.    5/9/2013 1:18 PM Collette Denise  Left.  Open ankle procedure, medially and laterally.  Sunil Ambrocio MD, [facility].  Repair of knee joint (57643754) on 11/8/2007 at 52 Years.  Comments:  5/9/2013 1:34 PM Collette Denise  Right.  Dr. Varghese, [place], [place].  Colonoscopy (701344535) on 3/8/2006 at 50 Years.  Removal of mole of skin by excision (685093292) on 6/23/2000 at 44 Years.  Vasectomy (44259766) in 1986 at 31 Years.  Tonsillectomy (487803903) in 1961 at 6 Years.  Comments:  5/9/2013 1:14 PM Collette Denise  As a child.  Left ankle (89359420).   Social History:        Alcohol Assessment            Current, 5-6 times per week, 5 drinks/episode average.  6 drinks/episode maximum.      Tobacco Assessment            Never      Substance Abuse Assessment            Never      Employment and Education Assessment            Employed, Work/School description: Retired.      Home and Environment Assessment            Marital status:  (Living together).  Spouse/Partner name: Eli.  2 children.  Risks in environment:               Does not wear helmet, owns secured gun.      Nutrition and Health Assessment            Type of diet: Regular.      Exercise and  Physical Activity Assessment            Exercise frequency: 2-3 times per week.  Exercise type: Walking, work.      Sexual Assessment            Sexually active: Yes.  Identifies as male, Sexual orientation: Straight or heterosexual.        Physical Examination   vital signs stable, as noted above   Vital Signs   12/27/2019 1:02 PM CST Temperature Tympanic 97 DegF  LOW    Peripheral Pulse Rate 80 bpm    Systolic Blood Pressure 120 mmHg    Diastolic Blood Pressure 66 mmHg    Mean Arterial Pressure 84 mmHg      Measurements from flowsheet : Measurements   12/27/2019 1:02 PM CST Height Measured - Standard 72.25 in    Weight Measured - Standard 228.4 lb    BSA 2.3 m2    Body Mass Index 30.76 kg/m2  HI      General:  Alert and oriented, No acute distress.    Eye:  Extraocular movements are intact.    HENT:  Normocephalic, Tympanic membranes are clear, Oral mucosa is moist.    Neck:  Supple, Non-tender.    Respiratory:  Lungs are clear to auscultation, Respirations are non-labored.    Cardiovascular:  Normal rate, Regular rhythm, No murmur, No edema.    Gastrointestinal:  Soft, Non-tender.    Musculoskeletal:  Normal range of motion, Normal strength, No tenderness.    Neurologic:  Alert, Oriented, Normal motor function, No focal deficits.    Cognition and Speech:  Oriented, Speech clear and coherent.    Psychiatric:  Appropriate mood & affect.       Review / Management   Results review:  Lab results   12/20/2019 8:05 AM CST Sodium Level 140 mmol/L    Potassium Level 4.4 mmol/L    Chloride Level 106 mmol/L    CO2 Level 27 mmol/L    Glucose Level 101 mg/dL  HI    BUN 16 mg/dL    Creatinine 1.13 mg/dL    BUN/Creat Ratio NOT APPLICABLE    eGFR 69 mL/min/1.73m2    eGFR African American 80 mL/min/1.73m2    Calcium Level 9.3 mg/dL    Cholesterol 245 mg/dL  HI    Non-  HI    HDL 62 mg/dL    Chol/HDL Ratio 4.0      HI    Triglyceride 137 mg/dL   .       Impression and Plan   Diagnosis     Right cervical radiculopathy  (XCG75-RY M54.12).     DJD (Degenerative Joint Disease) (QLF19-TB M19.90).     Hypertension (TZR44-XA I10).       .) h/o cervicalgia  MRI of cervical spine (11/2016)   - C3-C4, C4-C5, C6-7 foraminal stenosis on right side; most severe at C4-C5   - clinically his symptoms correlate with right sided C5 radiculopathy   - improved with conservative measures    .) HLPD  - family history of premature heart disease  - previously declined coronary calcium scoring  - monitor for now; active lifestyle    .) health maintenance  - normal colonoscopy '16; would plan to repeat in '21 (family h/o colon cancer/polyps)  - declines PSA testing for now  - discussed and updated adult vaccinations  - repeatedly fails hearing screen - not interested in hearing aids at this time  - chronic rosacea - prescribed maintenance doxycycline    RTC annually

## 2022-02-16 NOTE — NURSING NOTE
Hearing and Vision Screening Entered On:  12/27/2019 1:06 PM CST    Performed On:  12/27/2019 1:06 PM CST by Evelyn Leung CMA               Hearing and Vision Screening   Audiogram Result Right Ear :   Fail   Audiogram Result Left Ear :   Fail   Hearing Screen Comments :   declines testing   Evelyn Leung CMA - 12/27/2019 1:06 PM CST

## 2023-09-27 NOTE — PROGRESS NOTES
Patient:   JOSE DE JESUS DUQUE            MRN: [id number]            FIN: [id number]               Age:   63 years     Sex:  Male     :  1955   Associated Diagnoses:   Right cervical radiculopathy; DJD (Degenerative Joint Disease); Hypertension   Author:   Amauri Kidd MD      Visit Information      Date of Service: 2018 12:51 pm  Performing Location: [facility]  Encounter#: [id number]      Primary Care Provider (PCP):  Amauri Kidd MD    NPI# [id number]      Referring Provider:  Amauri Kidd MD    NPI# [id number]      Chief Complaint   2018 12:58 PM CST  Physical              Additional Information:No additional information recorded during visit.   Chief complaint and symptoms as noted above and confirmed with patient.  Recent lab and diagnostic studies reviewed with patient      History of Present Illness   2018: Jose De Jesus presents for annual physical.  Overall feeling well.  No specific complaints or concerns.  Remains active with his fall hunting.  Works out routinely at the wellness center.  Feels well in the process.         Review of Systems   Constitutional:  No fever, No chills.    Eye:  Negative except as documented in history of present illness.    Ear/Nose/Mouth/Throat:  Negative except as documented in history of present illness.    Respiratory:  No shortness of breath.    Cardiovascular:  No chest pain, No palpitations, No peripheral edema, No syncope.    Gastrointestinal:  No nausea, No vomiting, No abdominal pain.    Genitourinary:  No dysuria, No hematuria.    Hematology/Lymphatics:  Negative except as documented in history of present illness.    Endocrine:  No excessive thirst, No polyuria.    Immunologic:  No recurrent fevers.    Musculoskeletal:  Back pain, No neck pain, No joint pain, No muscle pain.    Neurologic:  Alert and oriented X4, Numbness, No tingling, No headache.       Health Status   Allergies:    Allergic Reactions (Selected)  No known allergies    Medications:  (Selected)   Prescriptions  Prescribed  propranolol 60 mg oral capsule, extended release: 1 cap(s) ( 60 mg ), po, daily, # 90 cap(s), 3 Refill(s), Type: Maintenance, Pharmacy: [facility] [place], keep on file and pt will notify when needed, 1 cap(s) po daily  Documented Medications  Documented  doxycycline monohydrate 50 mg oral capsule: = 1 cap(s) ( 50 mg ), Oral, daily, 0 Refill(s), Type: Maintenance   Problem list:    All Problems  Essential tremor / SNOMED CT 9200993098 / Confirmed  Warfarin anticoagulation / SNOMED CT 350887240 / Confirmed  Status post right hip replacement / SNOMED CT 257496701 / Confirmed  S/p knee replacement / SNOMED CT 565961869 / Confirmed  Hypertension / SNOMED CT 3518793020 / Confirmed  Obesity / SNOMED CT 5830032343 / Probable  DJD (degenerative joint disease) / SNOMED CT 0050729625 / Confirmed  Rosacea / SNOMED CT 5625476999 / Confirmed  Resolved: Inpatient stay / SNOMED CT 375375097  Canceled: Colonoscopy / SNOMED CT 971007019      Histories   Past Medical History:    Active  Rosacea (5696537587): Onset in 1995 at 40 years.  DJD (degenerative joint disease) (4769891209)  Essential tremor (1872553356)  Hypertension (7102102999)  Resolved  Inpatient stay (948673570): Onset on 1/18/2017 at 61 years.  Resolved on 1/20/2017 at 61 years.  Comments:  2/7/2017 CST 7:38 AM CST - Jessie Choudhury  @[facility] - Primary osteoarthritis of right knee   Family History:    Pulmonary fibrosis  Father  Acne rosacea  Brother  Brother  Cancer of prostate  Father  Crohn disease  Daughter (Araceli)  Alzheimer disease  Mother     Procedure history:    Arthroplasty of knee (33994774) on 1/18/2017 at 61 Years.  Comments:  2/7/2017 7:39 AM - Jessie Choudhury  Right  Colonoscopy (751679774) on 10/17/2016 at 61 Years.  Comments:  10/25/2016 7:54 AM - Jessenia Chauhan  Indication:  Screening for colorectal malignant neoplasm.  Sedation:  Midazolam 3 mg IV, Fentanyl 150 mcg IV.  Impression:   Civerticulosis in the sigmoid colon; exam otherwise normal.  Recommendation:  Repeat in 10 years.  Arthrodesis (23083180) on 10/26/2009 at 54 Years.  Comments:  5/9/2013 1:18 PM - Collette Kidd  [facility]    5/9/2013 1:13 PM - Collette Kidd  Left subtalar joint.  Surgeon: Sunil Ambrocio MD.  Excision of osteophyte (623731632) on 2/23/2009 at 53 Years.  Comments:  5/9/2013 1:28 PM - Collette Kidd  Excision of bone spur.    5/9/2013 1:18 PM - Collette Kidd  Left.  Open ankle procedure, medially and laterally.  Sunil Ambrocio MD, [facility].  Repair of knee joint (72018054) on 11/8/2007 at 52 Years.  Comments:  5/9/2013 1:34 PM - Collette Kidd  Right.  Dr. Varghese, [place], [place].  Colonoscopy (149968317) on 3/8/2006 at 50 Years.  Removal of mole of skin by excision (974084600) on 6/23/2000 at 44 Years.  Vasectomy (41940216) in 1986 at 31 Years.  Tonsillectomy (489829495) in 1961 at 6 Years.  Comments:  5/9/2013 1:14 PM - Collette Kidd  As a child.  Left ankle (60558589).   Social History:        Alcohol Assessment            Current, 5-6 times per week, 1 drinks/episode average.  2 drinks/episode maximum.      Tobacco Assessment            Never      Substance Abuse Assessment            Never      Employment and Education Assessment            Employed, Work/School description: Retired.      Home and Environment Assessment            Marital status:  (Living together).  Spouse/Partner name: Eli.  2 children.  Risks in environment:               Does not wear helmet, owns secured gun.      Nutrition and Health Assessment            Type of diet: Regular.      Exercise and Physical Activity Assessment            Exercise frequency: Daily.  Exercise type: Walking, stretching.      Sexual Assessment            Sexually active: Yes.  Sexual orientation: Heterosexual.        Physical Examination   vital signs stable, as noted above   Vital Signs   11/27/2018 12:58 PM CST Temperature Tympanic  97.4 DegF  LOW    Peripheral Pulse Rate 60 bpm    Systolic Blood Pressure 120 mmHg    Diastolic Blood Pressure 70 mmHg    Mean Arterial Pressure 87 mmHg    BP Site Right arm      Measurements from flowsheet : Measurements   11/27/2018 12:58 PM CST  Weight Measured - Standard                223.12 lb     General:  Alert and oriented, No acute distress.    Eye:  Extraocular movements are intact.    HENT:  Normocephalic, Oral mucosa is moist.    Neck:  Supple, Non-tender.    Respiratory:  Lungs are clear to auscultation, Respirations are non-labored.    Cardiovascular:  Normal rate, Regular rhythm, No murmur, No edema.    Gastrointestinal:  Soft.    Musculoskeletal:  Normal range of motion, Normal strength, No tenderness.    Neurologic:  Alert, Oriented, Normal motor function, No focal deficits.    Cognition and Speech:  Oriented, Speech clear and coherent.    Psychiatric:  Appropriate mood & affect.       Review / Management   Results review:  Lab results   11/16/2018 7:56 AM CST Sodium Level 138 mmol/L    Potassium Level 4.4 mmol/L    Chloride Level 106 mmol/L    CO2 Level 27 mmol/L    Glucose Level 111 mg/dL  HI    BUN 20 mg/dL    Creatinine 1.12 mg/dL    BUN/Creat Ratio NOT APPLICABLE    eGFR 70 mL/min/1.73m2    eGFR African American 81 mL/min/1.73m2    Calcium Level 9.5 mg/dL    Cholesterol 227 mg/dL  HI    Non-  HI    HDL 68 mg/dL    Chol/HDL Ratio 3.3      HI    Triglyceride 137 mg/dL   .       Impression and Plan   Diagnosis     Right cervical radiculopathy (HGJ60-OZ M54.12).     DJD (Degenerative Joint Disease) (VOY14-KX M19.90).     Hypertension (FSE77-NC I10).       .) h/o cervicalgia  MRI of cervical spine (11/2016)   - C3-C4, C4-C5, C6-7 foraminal stenosis on right side; most severe at C4-C5   - clinically his symptoms correlate with right sided C5 radiculopathy   - improved with conservative measures    .) HLPD  - family history of premature heart disease  - previously declined coronary  calcium scoring  - monitor for now; active lifestyle    .) health maintenance  - family h/o colon cancer/polyps  - normal colonoscopy '16; would plan to repeat in '21  - declines PSA testing for now  - discussed and updated adult vaccinations    RTC annually   No